# Patient Record
Sex: FEMALE | Race: WHITE | NOT HISPANIC OR LATINO | Employment: FULL TIME | URBAN - METROPOLITAN AREA
[De-identification: names, ages, dates, MRNs, and addresses within clinical notes are randomized per-mention and may not be internally consistent; named-entity substitution may affect disease eponyms.]

---

## 2020-02-24 ENCOUNTER — OFFICE VISIT (OUTPATIENT)
Dept: OBGYN CLINIC | Facility: CLINIC | Age: 40
End: 2020-02-24
Payer: COMMERCIAL

## 2020-02-24 ENCOUNTER — APPOINTMENT (OUTPATIENT)
Dept: RADIOLOGY | Facility: CLINIC | Age: 40
End: 2020-02-24
Payer: COMMERCIAL

## 2020-02-24 VITALS
WEIGHT: 135 LBS | SYSTOLIC BLOOD PRESSURE: 132 MMHG | HEART RATE: 80 BPM | HEIGHT: 64 IN | BODY MASS INDEX: 23.05 KG/M2 | DIASTOLIC BLOOD PRESSURE: 84 MMHG

## 2020-02-24 DIAGNOSIS — S82.444A CLOSED NONDISPLACED SPIRAL FRACTURE OF SHAFT OF RIGHT FIBULA, INITIAL ENCOUNTER: Primary | ICD-10-CM

## 2020-02-24 DIAGNOSIS — M25.571 PAIN, JOINT, ANKLE AND FOOT, RIGHT: ICD-10-CM

## 2020-02-24 PROCEDURE — 73610 X-RAY EXAM OF ANKLE: CPT

## 2020-02-24 PROCEDURE — 27786 TREATMENT OF ANKLE FRACTURE: CPT | Performed by: ORTHOPAEDIC SURGERY

## 2020-02-24 PROCEDURE — 99203 OFFICE O/P NEW LOW 30 MIN: CPT | Performed by: ORTHOPAEDIC SURGERY

## 2020-02-24 NOTE — PROGRESS NOTES
Assessment/Plan:  1  Closed nondisplaced spiral fracture of shaft of right fibula, initial encounter  XR ankle 3+ vw right       Celina Davila has what appears to be a subtle nondisplaced spiral fracture in the shaft of the fibula seen best on lateral x-ray  We did place her in a tall Cam boot in the office today which she tolerated well  I would like for her to return the office in 3 weeks for repeat x-ray evaluation  She may continue with rest, ice, compression elevation for now  Subjective: Klarissa Crabtree is a 46 y o  female who presents to the office for evaluation for right ankle injury  She states 1 week ago she inverted her right ankle going down the stairs  She had immediate pain and swelling to the lateral aspect of the right ankle  The pain has continued to bother her with walking  She did go to urgent care and had x-rays which did not see an obvious fracture  She has pain today that is sharp and stabbing in nature over the lateral aspect her right ankle  She states that she has rolled her ankle in the past but never had this amount of pain or bruising  She states the bruising has been traveling down her right foot to her heel and even to her toes  She denies any numbness and tingling into her toes  She denies any history of fracture or surgery in the past     Review of Systems   Constitutional: Negative for chills, fever and unexpected weight change  HENT: Negative for hearing loss, nosebleeds and sore throat  Eyes: Negative for pain, redness and visual disturbance  Respiratory: Negative for cough, shortness of breath and wheezing  Cardiovascular: Negative for chest pain, palpitations and leg swelling  Gastrointestinal: Negative for abdominal pain, nausea and vomiting  Endocrine: Negative for polydipsia and polyuria  Genitourinary: Negative for dysuria and hematuria  Musculoskeletal:        See HPI   Skin: Negative for rash and wound     Neurological: Negative for dizziness, numbness and headaches  Psychiatric/Behavioral: Negative for decreased concentration and suicidal ideas  The patient is not nervous/anxious  History reviewed  No pertinent past medical history  History reviewed  No pertinent surgical history  Family History   Problem Relation Age of Onset    No Known Problems Mother     No Known Problems Father     No Known Problems Sister     No Known Problems Brother     Cancer Maternal Grandmother        Social History     Occupational History    Not on file   Tobacco Use    Smoking status: Never Smoker    Smokeless tobacco: Never Used   Substance and Sexual Activity    Alcohol use: Never     Frequency: Never    Drug use: Never    Sexual activity: Not on file       No current outpatient medications on file  No Known Allergies    Objective:  Vitals:    02/24/20 1317   BP: 132/84   Pulse: 80       Right Ankle Exam     Tenderness   The patient is experiencing tenderness in the lateral malleolus and ATF (Distal fibula)  Swelling: mild    Range of Motion   Dorsiflexion: abnormal   Plantar flexion: normal   Eversion:  10 abnormal   Inversion: normal     Muscle Strength   Dorsiflexion:  5/5  Plantar flexion:  5/5  Anterior tibial:  5/5  Posterior tibial:  5/5  Gastrocsoleus:  5/5  Peroneal muscle:  4/5    Tests   Anterior drawer: negative    Other   Erythema: absent  Sensation: normal  Pulse: present     Comments:  Ecchymosis over lateral ankle down to dorsum of foot and toes          Strength/Myotome Testing     Right Ankle/Foot   Dorsiflexion: 5  Plantar flexion: 5      Physical Exam   Constitutional: She is oriented to person, place, and time  She appears well-developed and well-nourished  HENT:   Head: Normocephalic and atraumatic  Eyes: Pupils are equal, round, and reactive to light  Conjunctivae are normal    Neck: Normal range of motion  Neck supple  Cardiovascular: Normal rate and intact distal pulses     Pulmonary/Chest: Effort normal  No respiratory distress  Musculoskeletal:   As noted in HPI   Neurological: She is alert and oriented to person, place, and time  Skin: Skin is warm and dry  Psychiatric: She has a normal mood and affect  Her behavior is normal    Nursing note and vitals reviewed  I have personally reviewed pertinent films in PACS and my interpretation is as follows:  Five view x-rays of the right ankle in the office today demonstrate a very subtle fracture in the distal shaft of the fibula concerning for nondisplaced spiral fracture  Fracture / Dislocation Treatment  Date/Time: 2/24/2020 2:14 PM  Performed by: Magnus Santos DO  Authorized by: Magnus Santos DO     Patient Location:  Clinic  Verbal consent obtained?: Yes    Consent given by:  Patient  Radiology Images displayed and confirmed  If images not available, report reviewed: Yes    Injury location:  Ankle  Location details:  Right ankle  Injury type:  Fracture  Fracture type: lateral malleolus    Fracture type: lateral malleolus    Neurovascular status: Neurovascularly intact    Manipulation performed?: No    Immobilization: Long Cam boot    Neurovascular status: Neurovascularly intact

## 2020-03-05 ENCOUNTER — TELEPHONE (OUTPATIENT)
Dept: OBGYN CLINIC | Facility: HOSPITAL | Age: 40
End: 2020-03-05

## 2020-03-05 NOTE — TELEPHONE ENCOUNTER
Spoke to patient  Advised that pain is to be expected, ankle injuries are painful and do tend to be slower healing  She questions the amount of weight she should be putting on the ankle in the boot  Please advise, I dont see it in the note if she is WBAT  Patient does have follow up scheduled

## 2020-03-05 NOTE — TELEPHONE ENCOUNTER
[facility]  [phone]    Dr Fried    Pt states pain level in ankle is the same as when she saw you on 02/24  She wants to know if this is normal or should she come in for appt

## 2020-03-06 NOTE — TELEPHONE ENCOUNTER
Agree with recommendation  Stay in boot  WBAT  Crutches only if pain is more severe with ambulation  We wish you continued good healing. If you have any questions or concerns, please do not hesitate to contact us at [phone].      Please remember to call and schedule a follow up appointment if one was recommended at your earliest convenience.   PODIATRY CLINIC HOURS  TELEPHONE NUMBER    Dr. Scooter Richards D.P.M [facility]    Clinics:  [facility]        Tiffanie Bobby MA  Medical Assistant  Tuesday 1PM-6PM  [facility]  Wednesday 7AM-2PM  [place]  Thursday 10AM-6PM  [place] Friday 7AM-345PM  [place]  Specialty schedulers:   [phone] to make an appointment with any Specialty Provider.        Urgent Care locations:    [facility] Monday-Friday 5 pm - 9 pm. Saturday-Sunday 9 am -5pm    Monday-Friday 11 am - 9 pm Saturday 9 am - 5 pm     Monday-Sunday 12 noon-8PM [phone]     If you need a medication refill, please contact us you may need lab work and/or a follow up visit prior to your refill (i.e. Antifungal medications).    [employer] (secure e-mail communication and access to your chart) to send a message or to make an appointment.    If MRI needed please call Valentin Bill at [phone]        Weight management plan: Patient was referred to their PCP to discuss a diet and exercise plan.

## 2020-03-16 ENCOUNTER — OFFICE VISIT (OUTPATIENT)
Dept: OBGYN CLINIC | Facility: CLINIC | Age: 40
End: 2020-03-16

## 2020-03-16 ENCOUNTER — APPOINTMENT (OUTPATIENT)
Dept: RADIOLOGY | Facility: CLINIC | Age: 40
End: 2020-03-16
Payer: COMMERCIAL

## 2020-03-16 VITALS
SYSTOLIC BLOOD PRESSURE: 128 MMHG | HEIGHT: 64 IN | DIASTOLIC BLOOD PRESSURE: 70 MMHG | HEART RATE: 80 BPM | BODY MASS INDEX: 23.05 KG/M2 | WEIGHT: 135 LBS

## 2020-03-16 DIAGNOSIS — S82.444A CLOSED NONDISPLACED SPIRAL FRACTURE OF SHAFT OF RIGHT FIBULA, INITIAL ENCOUNTER: ICD-10-CM

## 2020-03-16 DIAGNOSIS — S82.444A CLOSED NONDISPLACED SPIRAL FRACTURE OF SHAFT OF RIGHT FIBULA, INITIAL ENCOUNTER: Primary | ICD-10-CM

## 2020-03-16 PROCEDURE — 99024 POSTOP FOLLOW-UP VISIT: CPT | Performed by: ORTHOPAEDIC SURGERY

## 2020-03-16 PROCEDURE — 73610 X-RAY EXAM OF ANKLE: CPT

## 2020-03-16 NOTE — PROGRESS NOTES
Assessment/Plan:  1  Closed nondisplaced spiral fracture of shaft of right fibula, initial encounter  XR ankle 3+ vw right       Jennifer Ramirez has no pain in her right ankle and a stable exam today  She does lack range of motion is quite stiff from being in the boot  She has no pain on exam in her x-ray is improving  I would like for her to begin aggressive range of motion and strengthening of her ankle  If she has significant stiffness and lack of improvement in 2 weeks we could start formal physical therapy  She will follow up only as needed  Subjective: Cory Paez is a 46 y o  female who presents for follow-up for a distal fibula fracture which occurred 4 weeks ago  She has been in a CAM walker boot for the last 3 weeks  She reports significant improvement and less pain in her ankle but she does have some stiffness  She has been walking in the boot for about the last 4-5 days without any pain  History reviewed  No pertinent past medical history  History reviewed  No pertinent surgical history  Family History   Problem Relation Age of Onset    No Known Problems Mother     No Known Problems Father     No Known Problems Sister     No Known Problems Brother     Cancer Maternal Grandmother        Social History     Occupational History    Not on file   Tobacco Use    Smoking status: Never Smoker    Smokeless tobacco: Never Used   Substance and Sexual Activity    Alcohol use: Never     Frequency: Never    Drug use: Never    Sexual activity: Not on file       No current outpatient medications on file  No Known Allergies    Objective:  Vitals:    03/16/20 1527   BP: 128/70   Pulse: 80       Right Ankle Exam     Tenderness   The patient is experiencing no tenderness    Swelling: none    Range of Motion   Dorsiflexion:  10 abnormal   Plantar flexion:  10 abnormal   Eversion:  10 abnormal   Inversion:  15 abnormal     Muscle Strength   Dorsiflexion:  5/5  Plantar flexion:  5/5  Anterior tibial:  5/5  Posterior tibial:  5/5  Gastrocsoleus:  5/5  Peroneal muscle:  5/5    Tests   Anterior drawer: negative    Other   Erythema: absent  Sensation: normal  Pulse: present           Strength/Myotome Testing     Right Ankle/Foot   Dorsiflexion: 5  Plantar flexion: 5      Physical Exam   Constitutional: She is oriented to person, place, and time  She appears well-developed and well-nourished  HENT:   Head: Normocephalic and atraumatic  Eyes: Pupils are equal, round, and reactive to light  Conjunctivae are normal    Neck: Normal range of motion  Neck supple  Cardiovascular: Normal rate and intact distal pulses  Pulmonary/Chest: Effort normal  No respiratory distress  Musculoskeletal:   As noted in HPI   Neurological: She is alert and oriented to person, place, and time  Skin: Skin is warm and dry  Psychiatric: She has a normal mood and affect  Her behavior is normal    Nursing note and vitals reviewed  I have personally reviewed pertinent films in PACS and my interpretation is as follows: Three-view x-ray of the right ankle in the office today demonstrates a healing, stable distal fibula fracture with less apparent fracture line and signs of callus formation